# Patient Record
Sex: MALE | Race: WHITE | NOT HISPANIC OR LATINO | Employment: FULL TIME | URBAN - METROPOLITAN AREA
[De-identification: names, ages, dates, MRNs, and addresses within clinical notes are randomized per-mention and may not be internally consistent; named-entity substitution may affect disease eponyms.]

---

## 2017-05-09 ENCOUNTER — COMMUNICATION - HEALTHEAST (OUTPATIENT)
Dept: PULMONOLOGY | Facility: OTHER | Age: 57
End: 2017-05-09

## 2021-04-16 ENCOUNTER — RECORDS - HEALTHEAST (OUTPATIENT)
Dept: LAB | Facility: CLINIC | Age: 61
End: 2021-04-16

## 2021-04-16 LAB
ANION GAP SERPL CALCULATED.3IONS-SCNC: 16 MMOL/L (ref 5–18)
BUN SERPL-MCNC: 27 MG/DL (ref 8–22)
CALCIUM SERPL-MCNC: 8.9 MG/DL (ref 8.5–10.5)
CHLORIDE BLD-SCNC: 96 MMOL/L (ref 98–107)
CO2 SERPL-SCNC: 22 MMOL/L (ref 22–31)
CREAT SERPL-MCNC: 1.46 MG/DL (ref 0.7–1.3)
GFR SERPL CREATININE-BSD FRML MDRD: 49 ML/MIN/1.73M2
GLUCOSE BLD-MCNC: 271 MG/DL (ref 70–125)
POTASSIUM BLD-SCNC: 4.1 MMOL/L (ref 3.5–5)
SODIUM SERPL-SCNC: 134 MMOL/L (ref 136–145)

## 2021-05-05 ENCOUNTER — RECORDS - HEALTHEAST (OUTPATIENT)
Dept: LAB | Facility: CLINIC | Age: 61
End: 2021-05-05

## 2021-05-06 LAB
ALBUMIN SERPL-MCNC: 3.4 G/DL (ref 3.5–5)
ALP SERPL-CCNC: 67 U/L (ref 45–120)
ALT SERPL W P-5'-P-CCNC: 119 U/L (ref 0–45)
ANION GAP SERPL CALCULATED.3IONS-SCNC: 11 MMOL/L (ref 5–18)
AST SERPL W P-5'-P-CCNC: 38 U/L (ref 0–40)
BASOPHILS # BLD AUTO: 0.1 THOU/UL (ref 0–0.2)
BASOPHILS NFR BLD AUTO: 1 % (ref 0–2)
BILIRUB SERPL-MCNC: 0.8 MG/DL (ref 0–1)
BUN SERPL-MCNC: 17 MG/DL (ref 8–22)
CALCIUM SERPL-MCNC: 9.2 MG/DL (ref 8.5–10.5)
CHLORIDE BLD-SCNC: 95 MMOL/L (ref 98–107)
CO2 SERPL-SCNC: 23 MMOL/L (ref 22–31)
CREAT SERPL-MCNC: 1.11 MG/DL (ref 0.7–1.3)
EOSINOPHIL # BLD AUTO: 0.1 THOU/UL (ref 0–0.4)
EOSINOPHIL NFR BLD AUTO: 2 % (ref 0–6)
ERYTHROCYTE [DISTWIDTH] IN BLOOD BY AUTOMATED COUNT: 13.1 % (ref 11–14.5)
GFR SERPL CREATININE-BSD FRML MDRD: >60 ML/MIN/1.73M2
GLUCOSE BLD-MCNC: 207 MG/DL (ref 70–125)
HCT VFR BLD AUTO: 41 % (ref 40–54)
HGB BLD-MCNC: 13.8 G/DL (ref 14–18)
IMM GRANULOCYTES # BLD: 0 THOU/UL
IMM GRANULOCYTES NFR BLD: 0 %
LYMPHOCYTES # BLD AUTO: 1.7 THOU/UL (ref 0.8–4.4)
LYMPHOCYTES NFR BLD AUTO: 24 % (ref 20–40)
MCH RBC QN AUTO: 28.8 PG (ref 27–34)
MCHC RBC AUTO-ENTMCNC: 33.7 G/DL (ref 32–36)
MCV RBC AUTO: 86 FL (ref 80–100)
MONOCYTES # BLD AUTO: 0.8 THOU/UL (ref 0–0.9)
MONOCYTES NFR BLD AUTO: 12 % (ref 2–10)
NEUTROPHILS # BLD AUTO: 4.2 THOU/UL (ref 2–7.7)
NEUTROPHILS NFR BLD AUTO: 61 % (ref 50–70)
PLATELET # BLD AUTO: 374 THOU/UL (ref 140–440)
PMV BLD AUTO: 10.8 FL (ref 8.5–12.5)
POTASSIUM BLD-SCNC: 4.1 MMOL/L (ref 3.5–5)
PROT SERPL-MCNC: 6.3 G/DL (ref 6–8)
RBC # BLD AUTO: 4.79 MILL/UL (ref 4.4–6.2)
SODIUM SERPL-SCNC: 129 MMOL/L (ref 136–145)
WBC: 6.8 THOU/UL (ref 4–11)

## 2021-05-27 ENCOUNTER — RECORDS - HEALTHEAST (OUTPATIENT)
Dept: ADMINISTRATIVE | Facility: OTHER | Age: 61
End: 2021-05-27

## 2021-05-27 RX ORDER — INSULIN DEGLUDEC 200 U/ML
INJECTION, SOLUTION SUBCUTANEOUS
Status: SHIPPED | COMMUNITY
Start: 2020-06-17

## 2021-05-27 RX ORDER — AZITHROMYCIN 250 MG/1
TABLET, FILM COATED ORAL
Status: SHIPPED | COMMUNITY
Start: 2021-04-16

## 2021-06-16 PROBLEM — J96.01 ACUTE RESPIRATORY FAILURE WITH HYPOXIA (H): Status: ACTIVE | Noted: 2021-04-22

## 2021-06-16 PROBLEM — U07.1 PNEUMONIA DUE TO 2019 NOVEL CORONAVIRUS: Status: ACTIVE | Noted: 2021-04-21

## 2021-06-16 PROBLEM — J12.82 PNEUMONIA DUE TO 2019 NOVEL CORONAVIRUS: Status: ACTIVE | Noted: 2021-04-21

## 2021-06-16 PROBLEM — U07.1 COVID-19: Status: ACTIVE | Noted: 2021-04-23

## 2021-06-16 PROBLEM — E78.5 HYPERLIPIDEMIA: Status: ACTIVE | Noted: 2021-05-27

## 2021-07-13 ENCOUNTER — HOSPITAL ENCOUNTER (OUTPATIENT)
Dept: CT IMAGING | Facility: HOSPITAL | Age: 61
Discharge: HOME | End: 2021-07-13
Attending: INTERNAL MEDICINE | Admitting: INTERNAL MEDICINE
Payer: COMMERCIAL

## 2021-07-13 DIAGNOSIS — R91.8 PULMONARY NODULES: ICD-10-CM

## 2021-07-13 PROCEDURE — 71250 CT THORAX DX C-: CPT

## 2021-07-20 NOTE — RESULT ENCOUNTER NOTE
The nodule along the bottom of the right lung is stable on follow up & appears to be stable over the last 6 years. It has all the features of a benign nodule & will not require future follow-up. David

## 2023-06-21 ENCOUNTER — LAB REQUISITION (OUTPATIENT)
Dept: LAB | Facility: CLINIC | Age: 63
End: 2023-06-21

## 2023-06-21 DIAGNOSIS — J02.9 ACUTE PHARYNGITIS, UNSPECIFIED: ICD-10-CM

## 2023-06-21 PROCEDURE — 87081 CULTURE SCREEN ONLY: CPT | Performed by: PHYSICIAN ASSISTANT

## 2023-06-23 LAB — BACTERIA SPEC CULT: NORMAL

## 2023-11-01 ENCOUNTER — LAB REQUISITION (OUTPATIENT)
Dept: LAB | Facility: CLINIC | Age: 63
End: 2023-11-01

## 2023-11-01 DIAGNOSIS — I10 ESSENTIAL (PRIMARY) HYPERTENSION: ICD-10-CM

## 2023-11-01 PROCEDURE — 80048 BASIC METABOLIC PNL TOTAL CA: CPT | Performed by: FAMILY MEDICINE

## 2023-11-02 LAB
ANION GAP SERPL CALCULATED.3IONS-SCNC: 15 MMOL/L (ref 7–15)
BUN SERPL-MCNC: 20.5 MG/DL (ref 8–23)
CALCIUM SERPL-MCNC: 9.7 MG/DL (ref 8.8–10.2)
CHLORIDE SERPL-SCNC: 95 MMOL/L (ref 98–107)
CREAT SERPL-MCNC: 1.07 MG/DL (ref 0.67–1.17)
DEPRECATED HCO3 PLAS-SCNC: 24 MMOL/L (ref 22–29)
EGFRCR SERPLBLD CKD-EPI 2021: 78 ML/MIN/1.73M2
GLUCOSE SERPL-MCNC: 96 MG/DL (ref 70–99)
POTASSIUM SERPL-SCNC: 3.9 MMOL/L (ref 3.4–5.3)
SODIUM SERPL-SCNC: 134 MMOL/L (ref 135–145)